# Patient Record
(demographics unavailable — no encounter records)

---

## 2024-12-06 NOTE — HISTORY OF PRESENT ILLNESS
[] : via normal spontaneous vaginal delivery [Other: _____] : at [unfilled] [BW: _____] : weight of [unfilled] [Age: ___] : [unfilled] year old mother [G: ___] : G [unfilled] [P: ___] : P [unfilled] [Rubella (Immune)] : Rubella immune [VDRL/RPR (Reactive)] : VDRL/RPR reactive [None] : There are no risk factors [Normal] : Normal [Dark green] : dark green [In Bassinet/Crib] : sleeps in bassinet/crib [On back] : sleeps on back [No] : No cigarette smoke exposure [Rear facing car seat in back seat] : Rear facing car seat in back seat [Carbon Monoxide Detectors] : Carbon monoxide detectors at home [Smoke Detectors] : Smoke detectors at home. [Hepatitis B Vaccine Given] : Hepatitis B vaccine given [NO] : No [Born at ___ Wks Gestation] : The patient was born at [unfilled] weeks gestation [(1) _____] : [unfilled] [(5) _____] : [unfilled] [Length: _____] : length of [unfilled] [HC: _____] : head circumference of [unfilled] [HepBsAG] : HepBsAg negative [HIV] : HIV negative [HepC] : Hepatitis C negative [GBS] : GBS negative [] : negative [TcB: _____] : Transcutaneous Bilirubin [unfilled] mg/dL [Yes] : Yes [FreeTextEntry9] : O [de-identified] : 18 [FreeTextEntry8] : unremarkable [Exposure to electronic nicotine delivery system] : No exposure to electronic nicotine delivery system [FreeTextEntry7] : none [de-identified] : none [de-identified] : Similac 360  1-2 ounces every 3 hours [FreeTextEntry1] : First visit for this 2 day old who was born via induced vaginal delivery due to late pregnancy  elevated blood pressure, uncomplicate L&D and uncomplicated hospital stay at [facility].. She is feeding well, having regular BMs and voids. Mom feels well, mom and dad are tired. Older sibling is healthy. Family history is negative for any disease patterns.

## 2024-12-06 NOTE — DISCUSSION/SUMMARY
[Normal Growth] : growth [Normal Development] : developmental [No Elimination Concerns] : elimination [Continue Regimen] : feeding [No Skin Concerns] : skin [Normal Sleep Pattern] : sleep [Term Infant] : term infant [None] : no known medical problems [Anticipatory Guidance Given] : Anticipatory guidance addressed as per the history of present illness section [ Transition] :  transition [ Care] :  care [Nutritional Adequacy] : nutritional adequacy [Parental Well-Being] : parental well-being [Safety] : safety [Hepatitis B In Hospital] : Hepatitis B administered while in the hospital [No Medications] : ~He/She~ is not on any medications [Mother] : mother [Father] : father [FreeTextEntry1] : aD NORA FEEDS TO ENCOURAGE ELIMINATION OF BILIRUBIN. cHECK UP AT AGE 2 WEEKS. wELL  CARE REVIEWED BRIEFLY. Beyfortis  RSV MAB given and discussed.

## 2024-12-06 NOTE — PHYSICAL EXAM
[Alert] : alert [Normocephalic] : normocephalic [Flat Open Anterior Scalf] : flat open anterior fontanelle [Icteric sclera] : icteric sclera [Red Reflex Bilateral] : red reflex bilateral [Normally Placed Ears] : normally placed ears [Auricles Well Formed] : auricles well formed [Clear Tympanic membranes] : clear tympanic membranes [Light reflex present] : light reflex present [Bony structures visible] : bony structures visible [Patent Auditory Canal] : patent auditory canal [Nares Patent] : nares patent [Palate Intact] : palate intact [Supple, full passive range of motion] : supple, full passive range of motion [Symmetric Chest Rise] : symmetric chest rise [Clear to Auscultation Bilaterally] : clear to auscultation bilaterally [Regular Rate and Rhythm] : regular rate and rhythm [S1, S2 present] : S1, S2 present [+2 Femoral Pulses] : +2 femoral pulses [Soft] : soft [Umbilical Stump Dry, Clean, Intact] : umbilical stump dry, clean, intact [Normal external genitalia] : normal external genitalia [Patent Vagina] : patent vagina [Patent] : patent [Normally Placed] : normally placed [No Abnormal Lymph Nodes Palpated] : no abnormal lymph nodes palpated [Symmetric Flexed Extremities] : symmetric flexed extremities [Startle Reflex] : startle reflex present [Suck Reflex] : suck reflex present [Rooting] : rooting reflex present [Palmar Grasp] : palmar grasp present [Plantar Grasp] : plantar reflex present [Symmetric Mark] : symmetric [place] [Jaundice] : jaundice [Acute Distress] : no acute distress [Discharge] : no discharge [Palpable Masses] : no palpable masses [Murmurs] : no murmurs [Tender] : nontender [Distended] : not distended [Bowel Sounds] : bowel sounds absent [Splenomegaly] : no splenomegaly [Clavicular Crepitus] : no clavicular crepitus [Ludwig-Ortolani] : negative Ludwig-Ortolani [Spinal Dimple] : no spinal dimple [Tuft of Hair] : no tuft of hair

## 2024-12-16 NOTE — PHYSICAL EXAM
[Alert] : alert [Consolable] : consolable [Clear to Auscultation Bilaterally] : clear to auscultation bilaterally [Regular Rate and Rhythm] : regular rate and rhythm [Soft] : soft [No Acute Distress] : no acute distress [Transmitted Upper Airway Sounds] : no transmitted upper airway sounds [Subcostal Retractions] : no subcostal retractions [Tender] : nontender

## 2024-12-16 NOTE — HISTORY OF PRESENT ILLNESS
[FreeTextEntry6] : FRANSISCA presents today with fussiness, she is getting Formula 360 Similac total control every 3-4 hours. No spit ups.

## 2024-12-16 NOTE — DISCUSSION/SUMMARY
[FreeTextEntry1] : FRANSISCA presents today with gassiness, she is getting formula every 3-4 hours and has gained good weight.

## 2024-12-20 NOTE — DISCUSSION/SUMMARY
[FreeTextEntry1] : REASSURANCE RE:WEIGHT GAIN REVIEWED  CARE DISCUSSED GAS TREATMENTS, BICYCLE LEGS, RECTAL STIM MAY USE GAS DROPS PRN

## 2024-12-20 NOTE — PHYSICAL EXAM
[NL] : soft, nontender, nondistended, normal bowel sounds, no hepatosplenomegaly [No Abnormal Lymph Nodes Palpated] : no abnormal lymph nodes palpated [Warm, Well Perfused x4] : warm, well perfused x4 [Sacral Dimple] : no sacral dimple [Normotonic] : normotonic [Clear] : clear

## 2024-12-20 NOTE — HISTORY OF PRESENT ILLNESS
[FreeTextEntry6] : ESTAPBISHED PT FOLLOW UP WEIGHT GAIN BREAST AND BOTTLE FED  3 OZ BOTTLES EBM  NORMAL STOOL AND URINE OUTPUT  SCAB OFF THE BELLY BUTTON/ DRY   HAD SOME ISSUES WITH GAS NO BLOOD OR MUCUS IN THE STOOL  QUESTIONS ABOUT TREATMENT

## 2024-12-26 NOTE — PHYSICAL EXAM
[No Acute Distress] : no acute distress [Alert] : alert [Normocephalic] : normocephalic [EOMI] : grossly EOMI [Clear] : right tympanic membrane clear [Pink Nasal Mucosa] : nasal mucosa not pink

## 2024-12-26 NOTE — DISCUSSION/SUMMARY
[FreeTextEntry1] : FRANSISCA presents today with a diaper rash and will be treated with Nystatin. She has had cluster feedings but overall has been gaining weight.

## 2024-12-26 NOTE — HISTORY OF PRESENT ILLNESS
[FreeTextEntry6] : FRANSISCA presents today with a diaper rash. No increase in stooling. She has been cluster feeding.

## 2025-01-04 NOTE — HISTORY OF PRESENT ILLNESS
[Parents] : parents [Breast milk] : breast milk [Normal] : Normal [Yellow] : yellow [Seedy] : seedy [In Bassinet/Crib] : sleeps in bassinet/crib [On back] : sleeps on back [No] : No cigarette smoke exposure [Rear facing car seat in back seat] : Rear facing car seat in back seat [NO] : No [Loose bedding, pillow, toys, and/or bumpers in crib] : no loose bedding, pillow, toys, and/or bumpers in crib [Pacifier use] : not using pacifier [FreeTextEntry7] : LAST WELL AT BIRTH  [de-identified] : COLIC/GAS, DIFFICULTY EATING  [de-identified] : 2  OZ  EVERY 2 HRS MIXTURE OF BREAST AND     [FreeTextEntry3] : EVERY 2 HRS

## 2025-01-04 NOTE — PHYSICAL EXAM
[Alert] : alert [Normocephalic] : normocephalic [Flat Open Anterior [place]] : flat open anterior fontanelle [Flat Open Posterior [place]] : flat open posterior fontanelle [Red Reflex Bilateral] : red reflex bilateral [Normally Placed Ears] : normally placed ears [Light reflex present] : light reflex present [Nares Patent] : nares patent [Palate Intact] : palate intact [Supple, full passive range of motion] : supple, full passive range of motion [Clear to Auscultation Bilaterally] : clear to auscultation bilaterally [Regular Rate and Rhythm] : regular rate and rhythm [S1, S2 present] : S1, S2 present [Murmurs] : no murmurs [Soft] : soft [Distended] : not distended [Bowel Sounds] : bowel sounds present [Normal external genitailia] : normal external genitalia [No Abnormal Lymph Nodes Palpated] : no abnormal lymph nodes palpated [Ludwig-Ortolani] : negative Ludwig-Ortolani [Spinal Dimple] : no spinal dimple [Startle Reflex] : startle reflex present [Suck Reflex] : suck reflex present [Palmar Grasp] : palmar grasp reflex present [Rooting] : rooting reflex present [Plantar Grasp] : plantar grasp reflex present [Symmetric Mark] : symmetric [place] [Cranial Nerves Grossly Intact] : cranial nerves grossly intact [Rash and/or lesion present] : no rash/lesion [de-identified] : NO THRUSH

## 2025-01-04 NOTE — DISCUSSION/SUMMARY
[Normal Growth] : growth [No Elimination Concerns] : elimination [Continue Regimen] : feeding [No Skin Concerns] : skin [Normal Sleep Pattern] : sleep [Term Infant] : term infant [None] : no medical problems [Parental Well-Being] : parental well-being [Family Adjustment] : family adjustment [Feeding Routines] : feeding routines [Infant Adjustment] : infant adjustment [Safety] : safety [No Medication Changes] : No medication changes at this time [Mother] : mother [Father] : father [Parental Concerns Addressed] : Parental concerns addressed [de-identified] : DISCUSSED COLIC TREATMENTS, TRIAL OF SENSITIVE FORMULA [de-identified] : HEP B [de-identified] : NONE [de-identified] : WELL CARE IN 1 MONTH [] : The components of the vaccine(s) to be administered today are listed in the plan of care. The disease(s) for which the vaccine(s) are intended to prevent and the risks have been discussed with the caretaker.  The risks are also included in the appropriate vaccination information statements which have been provided to the patient's caregiver.  The caregiver has given consent to vaccinate.

## 2025-02-05 NOTE — DISCUSSION/SUMMARY
[Normal Development] : development  [No Elimination Concerns] : elimination [No Skin Concerns] : skin [Normal Sleep Pattern] : sleep [Term Infant] : term infant [Poor Weight Gain] : poor weight gain [Parental (Maternal) Well-Being] : parental (maternal) well-being [Infant-Family Synchrony] : infant-family synchrony [Nutritional Adequacy] : nutritional adequacy [Infant Behavior] : infant behavior [Safety] : safety [No Medications] : ~He/She~ is not on any medications [Mother] : mother [Father] : father [Parental Concerns Addressed] : Parental concerns addressed [de-identified] : TRY A DIFFERENT NIPPLE SHAPE  [de-identified] : SAFE TO SLEEP [de-identified] : PENTACEL PREVNAR ROTA [de-identified] : REVIEWED FEEDING POSITIONS  [de-identified] : NONE [de-identified] : WEIGHT CHECK IN 1 MONTH, WELL IN 2 MONTHS [] : The components of the vaccine(s) to be administered today are listed in the plan of care. The disease(s) for which the vaccine(s) are intended to prevent and the risks have been discussed with the caretaker.  The risks are also included in the appropriate vaccination information statements which have been provided to the patient's caregiver.  The caregiver has given consent to vaccinate.

## 2025-02-05 NOTE — HISTORY OF PRESENT ILLNESS
[Parents] : parents [Expressed Breast milk ___oz/feed] : [unfilled] oz of expressed breast milk per feed [In Bassinet/Crib] : sleeps in bassinet/crib [On back] : sleeps on back [Loose bedding, pillow, toys, and/or bumpers in crib] : no loose bedding, pillow, toys, and/or bumpers in crib [Pacifier use] : Pacifier use [No] : No cigarette smoke exposure [Rear facing car seat in back seat] : Rear facing car seat in back seat [FreeTextEntry7] : LAST WELL AT 1 MONTH  [de-identified] : DIFFICULTY FEEDING PREFERS TO SLEEP TRIED TO CHANGE FORMULA  [de-identified] : SIM SENSITIVE NO ARCHING NO SPIT UP [FreeTextEntry8] : SOFT BUT INFREQUENT STOOL  [FreeTextEntry3] : PARENTS HAVE TO WAKE FOR FEEDINGS [NO] : No

## 2025-02-05 NOTE — PHYSICAL EXAM
[Alert] : alert [Normocephalic] : normocephalic [Flat Open Anterior [place]] : flat open anterior fontanelle [Flat Open Posterior [place]] : flat open posterior fontanelle [Red Reflex Bilateral] : red reflex bilateral [Normally Placed Ears] : normally placed ears [Light reflex present] : light reflex present [Nares Patent] : nares patent [Palate Intact] : palate intact [Supple, full passive range of motion] : supple, full passive range of motion [Clear to Auscultation Bilaterally] : clear to auscultation bilaterally [Regular Rate and Rhythm] : regular rate and rhythm [S1, S2 present] : S1, S2 present [Murmurs] : no murmurs [Soft] : soft [Distended] : not distended [Bowel Sounds] : bowel sounds present [Normal external genitailia] : normal external genitalia [Patent] : patent [Normally Placed] : normally placed [No Abnormal Lymph Nodes Palpated] : no abnormal lymph nodes palpated [Ludwig-Ortolani] : negative Ludwig-Ortolani [Spinal Dimple] : no spinal dimple [Startle Reflex] : startle reflex present [Suck Reflex] : suck reflex present [Rooting] : rooting reflex present [Palmar Grasp] : palmar grasp reflex present [Plantar Grasp] : plantar grasp reflex present [Stepping Reflex] : stepping reflex present [Cranial Nerves Grossly Intact] : cranial nerves grossly intact [Rash and/or lesion present] : no rash/lesion [de-identified] : NO THRUSH NO TONGUE OR LIP TIE NO CLEFT

## 2025-02-05 NOTE — DEVELOPMENTAL MILESTONES
[Normal Development] : Normal Development [None] : none [Smiles responsively] : smiles responsively [Vocalizes with simple cooing] : vocalizes with simple cooing [Lifts head and chest in prone] : lifts head and chest in prone [Opens and shuts hands] : opens and shuts hands [Passed] : passed [FreeTextEntry2] : 2 [No] : Not Completed.

## 2025-03-07 NOTE — HISTORY OF PRESENT ILLNESS
[FreeTextEntry6] : ESTABLISHED PT  FOLLOW UP POOR WEIGHT GAIN TAKING  4-5 OZ EVERY 4 HRS NO SPIT UP SOME BREAST MILK STRAINING BUT IS STOOLING MORE REGULARLY, ONCE PER DAY  NO BLOOD OR MUCUS

## 2025-03-07 NOTE — PHYSICAL EXAM
[Sunken [place]] : fontanelle flat [NL] : soft, nontender, nondistended, normal bowel sounds, no hepatosplenomegaly [Normal External Genitalia] : normal external genitalia [Patent] : patent [Moves All Extremities x 4] : moves all extremities x4 [Clear] : clear

## 2025-03-07 NOTE — DISCUSSION/SUMMARY
[FreeTextEntry1] : SLOW GAIN NO OBVIOUS INDICATIONS OF REFLUX OR CMPA ?CATCH DOWN GROWTH WILL MONITOR AT NEXT WELL 4/4/25

## 2025-06-09 NOTE — PHYSICAL EXAM
[Normocephalic] : normocephalic [Alert] : alert [Flat Open Anterior [place]] : flat open anterior fontanelle [Red Reflex] : red reflex bilateral [Normally Placed Ears] : normally placed ears [Clear Tympanic membranes] : clear tympanic membranes [Nares Patent] : nares patent [Palate Intact] : palate intact [Tooth Eruption] : tooth eruption [Supple, full passive range of motion] : supple, full passive range of motion [Clear to Auscultation Bilaterally] : clear to auscultation bilaterally [Regular Rate and Rhythm] : regular rate and rhythm [S1, S2 present] : S1, S2 present [Soft] : soft [Bowel Sounds] : bowel sounds present [Normal External Genitalia] : normal external genitalia [Patent] : patent [No Abnormal Lymph Nodes Palpated] : no abnormal lymph nodes palpated [Cranial Nerves Grossly Intact] : cranial nerves grossly intact [Murmurs] : no murmurs [Ludwig-Ortolani] : negative Ludwig-Ortolani [Spinal Dimple] : no spinal dimple [Rash or Lesions] : no rash/lesions [de-identified] : ONE LOWER TOOTH

## 2025-06-09 NOTE — DISCUSSION/SUMMARY
[Normal Growth] : growth [Normal Development] : development [No Elimination Concerns] : elimination [No Feeding Concerns] : feeding [No Skin Concerns] : skin [Normal Sleep Pattern] : sleep [Family Functioning] : family functioning [Infant Development] : infant development [Nutrition and Feeding] : nutrition and feeding [Oral Health] : oral health [No Medications] : ~He/She~ is not on any medications [Safety] : safety [Mother] : mother [] : The components of the vaccine(s) to be administered today are listed in the plan of care. The disease(s) for which the vaccine(s) are intended to prevent and the risks have been discussed with the caretaker.  The risks are also included in the appropriate vaccination information statements which have been provided to the patient's caregiver.  The caregiver has given consent to vaccinate. [de-identified] : START INFANT CEREAL, PUREES, ALLERGENS 1-2 X PER DAY [de-identified] : BETTER WEIGHT GAIN  [de-identified] : NONE [de-identified] : VAXALIS PREVNAR ROTA [de-identified] : WELL CARE IN 3 MONTHS

## 2025-06-09 NOTE — DEVELOPMENTAL MILESTONES
[Normal Development] : Normal Development [Pats or smiles at reflection] : pats or smiles at reflection [None] : none [Begins to turn when name called] : begins to turn when name called [Rolls over prone to supine] : rolls over prone to supine [Babbles] : babbles [Sits briefly without support] : sits briefly without support [Rakes small object with 4 fingers] : rakes small object with 4 fingers [Reaches for object and transfers] : reaches for object and transfers [[place] small object on surface] : bangs small object on surface [Passed] : passed [No] : Not Completed.

## 2025-06-09 NOTE — HISTORY OF PRESENT ILLNESS
[Mother] : mother [Normal] : Normal [Green/brown] : green/brown [In Bassinet/Crib] : sleeps in bassinet/crib [On back] : sleeps on back [Sleeps 12-16 hours per 24 hours (including naps)] : sleeps 12-16 hours per 24 hours (including naps) [Pacifier use] : Pacifier use [Tummy time] : tummy time [No] : No cigarette smoke exposure [Rear facing car seat in back seat] : Rear facing car seat in back seat [NO] : No [de-identified] : LAST WELL AT 4 MONTHS [de-identified] : WILL BE STARING DAY CARE SOME DAYS  [de-identified] :   8 OZ BOTTLES  30-32 OZ PER DAY INTERESTED IN FOODS [de-identified] : WAKES ONCE

## 2025-07-03 NOTE — DISCUSSION/SUMMARY
[FreeTextEntry1] : --- Briseyda is a 6 month old female with 2 days of fever in the setting of mild rhinorrhea/nasal congestion and teething. She is well-perfused, without acute respiratory distress, and in no acute distress on exam.  - Discussed supportive care: rest, hydration, nasal saline irrigation, nasal suctioning, and humidification. - Discussed offering cold/frozen teething toys. - Discussed OTC antipyretics PRN and age-appropriate weight-based dosing. - Discussed return precautions. - Father acknowledged understanding plan and demonstrated return.

## 2025-07-03 NOTE — PHYSICAL EXAM
[No Acute Distress] : acute distress [Alert] : alert [Playful] : playful [Normocephalic] : normocephalic [EOMI] : grossly EOMI [Pink Nasal Mucosa] : pink nasal mucosa [Clear Rhinorrhea] : clear rhinorrhea [Congestion] : congestion [Tooth Eruption] : tooth eruption  [Clear to Auscultation Bilaterally] : clear to auscultation bilaterally [Regular Rate and Rhythm] : regular rate and rhythm [Normal S1, S2 audible] : normal S1, S2 audible [Soft] : soft [Moves All Extremities x 4] : moves all extremities x4 [Warm, Well Perfused x4] : warm, well perfused x4 [Capillary Refill <2s] : capillary refill < 2s [Warm] : warm [Clear] : clear [Tired appearing] : not tired appearing [Lethargic] : not lethargic [Irritable] : not irritable [Toxic] : not toxic [Stridor] : no stridor [Sunken [place]] : fontanelle flat [Discharge] : no discharge [Conjuctival Injection] : no conjunctival injection [Erythema] : no erythema [Bulging] : not bulging [Purulent Effusion] : no purulent effusion [Nasal Flaring] : no nasal flaring [Erythematous Oropharynx] : nonerythematous oropharynx [Wheezing] : no wheezing [Rales] : no rales [Crackles] : no crackles [Transmitted Upper Airway Sounds] : no transmitted upper airway sounds [Tachypnea] : no tachypnea [[place]] : no rhonchi [Belly Breathing] : no belly breathing [Subcostal Retractions] : no subcostal retractions [Suprasternal Retractions] : no suprasternal retractions [Murmurs] : no murmurs [Distended] : nondistended

## 2025-07-03 NOTE — HISTORY OF PRESENT ILLNESS
[de-identified] : fever, runny nose, teething [FreeTextEntry6] : --- Father reports that Briseyda had a fever 2 days ago and yesterday after having attended a backup . Father states that she has been teething a lot lately too. Father notes that she has had runny/stuffy nose but is otherwise eating and drinking well and voiding frequently. Tmax 101F. No vomiting, diarrhea, rash, or ear tugging.